# Patient Record
Sex: FEMALE | Race: BLACK OR AFRICAN AMERICAN | Employment: FULL TIME | URBAN - METROPOLITAN AREA
[De-identification: names, ages, dates, MRNs, and addresses within clinical notes are randomized per-mention and may not be internally consistent; named-entity substitution may affect disease eponyms.]

---

## 2021-01-14 ENCOUNTER — VIRTUAL VISIT (OUTPATIENT)
Dept: PULMONOLOGY | Age: 41
End: 2021-01-14
Payer: COMMERCIAL

## 2021-01-14 DIAGNOSIS — R06.83 SNORING: ICD-10-CM

## 2021-01-14 DIAGNOSIS — E03.9 ACQUIRED HYPOTHYROIDISM: ICD-10-CM

## 2021-01-14 DIAGNOSIS — E28.2 BILATERAL POLYCYSTIC OVARIAN SYNDROME: Chronic | ICD-10-CM

## 2021-01-14 DIAGNOSIS — G47.10 HYPERSOMNIA: Primary | ICD-10-CM

## 2021-01-14 PROCEDURE — 99204 OFFICE O/P NEW MOD 45 MIN: CPT | Performed by: INTERNAL MEDICINE

## 2021-01-14 RX ORDER — NORGESTREL AND ETHINYL ESTRADIOL 0.3-0.03MG
1 KIT ORAL DAILY
COMMUNITY

## 2021-01-14 ASSESSMENT — SLEEP AND FATIGUE QUESTIONNAIRES
ESS TOTAL SCORE: 10
HOW LIKELY ARE YOU TO NOD OFF OR FALL ASLEEP WHILE WATCHING TV: 2

## 2021-01-14 NOTE — PROGRESS NOTES
Prince Tim ERIC, Baudilio Coughlin, [facility]  Tiffanie Kehrt CNP Ileen Rudder CNP [place] [place] [address]  [place] Bermeo [address] [facility], [address] [facility] [phone]   [facility]   [place] Bermeo. [address] [place] [address]. Moriah Pradhan 37 [phone]     Video Visit- Consult    Pursuant to the emergency declaration under the [address] [place] [address] [facility] authority and the [employer] Act, this Virtual  Visit was conducted, with patient's consent, to reduce the patient's risk of exposure to COVID-19. Services were provided through a video synchronous discussion virtually to substitute for in-person clinic visit. Patient was located in their home. Assessment:      Visit Diagnoses and Associated Orders     Hypersomnia   (New Problem)  -  Primary    needs work-up    Home Sleep Study [[id number] Custom]   - Future Order         Snoring   (New Problem)      needs work-up    Home Sleep Study [[id number] Custom]   - Future Order         Acquired hypothyroidism   (Stable)           Bilateral polycystic ovarian syndrome   (Stable)           ORDERS WITHOUT AN ASSOCIATED DIAGNOSIS    norgestrel-ethinyl estradiol (ELINEST) 0.3-30 MG-MCG per tablet [[id number]]             Plan:      Differential diagnosis includes but not limited to: MIGDALIA, PLMD's, narcolepsy, parasomnias. Reviewed MIGDALIA (highest likelihood Dx): pathophysiology, diagnosis, complications and treatment. Instructed her not to drive if drowsy. Continue medications per her PCP and other physicians. Limit caffeine use after 3pm. Standard of care is to do in-lab PSG but insurance is mandating an inferior HST. 1 wk follow up after study to review her results. The chronic medical conditions listed are directly related to the primary diagnosis listed above. The management of the primary diagnosis affects the secondary diagnosis and vice versa. Continue meds for: hypothyroid and PCOS.     Orders Placed This Encounter Procedures    Home Sleep Study          Subjective:     Patient ID: Candy Cruz is a 36 y.o. female. Chief Complaint   Patient presents with    Insomnia     wakes during the night    Snoring       HPI:      Candy Cruz is a 36 y.o. female self-referred for a sleep evaluation. She complains of: snoring, excessive daytime sleepiness , non-restorative sleep, tossing and turning at night and night sweats. She denies: cataplexy and hypnagogic hallucinations. Has difficulty staying asleep. Ambien did not help but only took 5 mg and for 2 nights. Has tried several OTC meds for sleep which does not help. PCOS and hypothyroid : stable on meds and followed by pt's PCP and other physicians. Previous evaluation and treatment has included- none    DOT/CDL - No  FAA/'s license -No    Previous Report(s) Reviewed: historical medical records, office notes, andreferral letter(s). Pertinent data has been documented. [place] - Total score: 10    Caffeine Intake - None.     Social History     Socioeconomic History    Marital status:      Spouse name: Not on file    Number of children: Not on file    Years of education: Not on file    Highest education level: Not on file   Occupational History    Not on file   Social Needs    Financial resource strain: Not on file    Food insecurity     Worry: Not on file     Inability: Not on file    Transportation needs     Medical: Not on file     Non-medical: Not on file   Tobacco Use    Smoking status: Never Smoker    Smokeless tobacco: Never Used   Substance and Sexual Activity    Alcohol use: No    Drug use: No    Sexual activity: Yes     Partners: Male   Lifestyle    Physical activity     Days per week: Not on file     Minutes per session: Not on file    Stress: Not on file   Relationships    Social connections     Talks on phone: Not on file     Gets together: Not on file     Attends [employer] service: Not on file Active member of club or organization: Not on file     Attends meetings of clubs or organizations: Not on file     Relationship status: Not on file    Intimate partner violence     Fear of current or ex partner: Not on file     Emotionally abused: Not on file     Physically abused: Not on file     Forced sexual activity: Not on file   Other Topics Concern    Not on file   Social History Narrative    Not on file        Current Outpatient Medications   Medication Sig Dispense Refill    norgestrel-ethinyl estradiol (ELINEST) 0.3-30 MG-MCG per tablet Take 1 tablet by mouth daily      metFORMIN (GLUCOPHAGE) 850 MG tablet Take 500 mg by mouth 2 times daily (with meals)       levothyroxine (SYNTHROID) 75 MCG tablet Take 50 mcg by mouth daily       JOLIVETTE 0.35 MG tablet Take 1 tablet by mouth daily   0    Prenatal Vit-Fe Fumarate-FA (PRENATAL VITAMIN) 27-0.8 MG TABS Take 1 tablet by mouth daily        No current facility-administered medications for this visit. Allergies as of 2021    (No Known Allergies)       Patient Active Problem List   Diagnosis    Spontaneous breech delivery    Breech presentation with  problem   Nemo [place] breech presentation    H/O  section    Multigravida    Bilateral polycystic ovarian syndrome    Adnexal pain    Premature rupture of membranes with onset of labor more than 24 hours following rupture    Encounter for supervision of normal pregnancy in multigravida    Routine postpartum follow-up    Normal pregnancy, incidental    Uterine leiomyoma    Acquired hypothyroidism       Past Medical History:   Diagnosis Date    Acquired hypothyroidism 2021    Bilateral polycystic ovarian syndrome 2015       History reviewed. No pertinent surgical history. History reviewed. No pertinent family history.     Objective:     Vitals:  Patient reported Height and Weight Calculated BMI   Patient-Reported Vitals 2021 Patient-Reported Weight 127#   Patient-Reported Height 5'4\"       21.8     Due to COVID-19 this was a virtual visit and physical exam was deferred.     Electronically signed by Warden Tyler MD on1/14/2021 at 5:06 PM

## 2021-02-03 ENCOUNTER — HOSPITAL ENCOUNTER (OUTPATIENT)
Dept: SLEEP CENTER | Age: 41
Discharge: HOME | End: 2021-02-03
Payer: COMMERCIAL

## 2021-02-03 DIAGNOSIS — G47.10 HYPERSOMNIA: ICD-10-CM

## 2021-02-03 DIAGNOSIS — R06.83 SNORING: ICD-10-CM

## 2021-02-03 PROCEDURE — 95806 SLEEP STUDY UNATT&RESP EFFT: CPT | Performed by: INTERNAL MEDICINE

## 2021-02-03 PROCEDURE — 95806 SLEEP STUDY UNATT&RESP EFFT: CPT

## 2021-02-10 ENCOUNTER — TELEPHONE (OUTPATIENT)
Dept: PULMONOLOGY | Age: 41
End: 2021-02-10

## 2021-02-10 NOTE — PROGRESS NOTES
Jamarcus BRODERICK [place]         : 1980    Diagnosis: [x] MIGDALIA (G47.33) [] CSA (G47.31) [] Apnea (G47.30)   Length of Need: [x] 12 Months [] 99 Months [] Other:    Machine (TAJ!): [x] Respironics Dream Station      Auto [] ResMed AirSense     Auto [] Other:     [x]  CPAP () [] Bilevel ()   Mode: [x] Auto [] Spontaneous    Mode: [] Auto [] Spontaneous          P min 6 cmH2O  P max 16 cmH2O                 Comfort Settings:   - Ramp Pressure: 4   cmH2O                                        - Ramp time: 15 min                                     -  Flex/EPR - 3 full time                                    - For ResMed Bilevel (TiMax-4 sec   TiMin- 0.2 sec)     Humidifier: [x] Heated ()        [x] Water chamber replacement ()/ 1 per 6 months        Mask:   [x] Nasal () /1 per 3 months [] Full Face () /1 per 3 months   [x] Patient choice -Size and fit mask [] Patient Choice - Size and fit mask   [] Dispense:  [] Dispense:    [x] Headgear () / 1 per 3 months [] Headgear () / 1 per 3 months   [x] Replacement Nasal Cushion ()/2 per month [] Interface Replacement ()/1 per month   [] Replacement Nasal Pillows ()/2 per month         Tubing: [x] Heated ()/1 per 3 months    [] Standard ()/1 per 3 months [] Other:           Filters: [x] Non-disposable ()/1 per 6 months     [x] Ultra-Fine, Disposable ()/2 per month        Miscellaneous: [] Chin Strap ()/ 1 per 6 months [] O2 bleed-in:       LPM   [] Oximetry on CPAP/Bilevel []  Other:    [x] Modem: ()         Start Order Date: 02/10/21    MEDICAL JUSTIFICATION:  I, the undersigned, certify that the above prescribed supplies are medically necessary for this patients wellbeing. In my opinion, the supplies are both reasonable and necessary in reference to accepted standards of medicalpractice in treatment of this patients condition.     Verlin Najjar, MD      NPI: [id number]       Order

## 2021-02-24 ENCOUNTER — TELEPHONE (OUTPATIENT)
Dept: PULMONOLOGY | Age: 41
End: 2021-02-24

## 2021-02-24 NOTE — TELEPHONE ENCOUNTER
Patient [facility] she believes forgot to tell Dr Atul Randhawa she is taking 500mg of Magnesium. Patient wants to know if she should continue taking or stop taking it?  If patient doesn't answer, leave a detailed message [phone]

## 2021-04-01 ENCOUNTER — VIRTUAL VISIT (OUTPATIENT)
Dept: PULMONOLOGY | Age: 41
End: 2021-04-01
Payer: COMMERCIAL

## 2021-04-01 DIAGNOSIS — G47.33 OSA (OBSTRUCTIVE SLEEP APNEA): Primary | ICD-10-CM

## 2021-04-01 PROCEDURE — 99212 OFFICE O/P EST SF 10 MIN: CPT | Performed by: NURSE PRACTITIONER

## 2021-04-01 RX ORDER — UBIDECARENONE 75 MG
50 CAPSULE ORAL DAILY
COMMUNITY

## 2021-04-01 RX ORDER — MULTIVIT WITH MINERALS/LUTEIN
1000 TABLET ORAL DAILY
COMMUNITY

## 2021-04-01 RX ORDER — BIOTIN 1 MG
5000 TABLET ORAL
COMMUNITY

## 2021-04-01 RX ORDER — LEVOTHYROXINE SODIUM 0.05 MG/1
50 TABLET ORAL DAILY
COMMUNITY

## 2021-04-01 RX ORDER — FOLIC ACID 0.8 MG
TABLET ORAL NIGHTLY
COMMUNITY

## 2021-04-01 ASSESSMENT — SLEEP AND FATIGUE QUESTIONNAIRES
HOW LIKELY ARE YOU TO NOD OFF OR FALL ASLEEP WHILE SITTING INACTIVE IN A PUBLIC PLACE: 1
HOW LIKELY ARE YOU TO NOD OFF OR FALL ASLEEP WHILE WATCHING TV: 1
HOW LIKELY ARE YOU TO NOD OFF OR FALL ASLEEP WHILE SITTING QUIETLY AFTER LUNCH WITHOUT ALCOHOL: 1

## 2021-04-01 NOTE — PROGRESS NOTES
Vianey Barnett MD, FAASM, [facility]  Rogelio Cho, MSN, RN, CNP     [address] [facility] MEDICINE  [place] [address] [address] [place] [id number]  Dept: [phone]  Dept Fax: [phone]  Loc: [phone]    Subjective:     Patient ID: Merlinda Curt is a 36 y.o. female. Chief Complaint   Patient presents with    Sleep Apnea       HPI:      Sleep Medicine Video Visit    Pursuant to the emergency declaration under the [address] [place] [address], formerly [facility] waiver authority and the [employer] and [employer] Act this Telephone Visit was insisted, with patient's consent, to reduce the patient's risk of exposure to COVID-19 and provide continuity of care for an established patient. Services were provided through a synchronous discussion over a telephone and/or Video chat to substitute for in-person clinic visit, and coded as such. While patient is at home. Machine Modem/Download Info:  Compliance (hours/night): 8.1 hrs/night  Download AHI (/hour): 1.5 /HR  Average CPAP Pressure : 7.5 cmH2O           APAP - Settings  Pressure Min: 6 cmH2O  Pressure Max: 16 cmH2O                 Comfort Settings  Humidity Level (0-8): 1  Flex/EPR (0-3): 3 PAP Mask  Clinically Relevant Leak: No     [place] - Total score: 9    Follow-up :     Last Visit : January 2021    Had study Insurance mandated HST done on 2/4/2021 which showed an RHI - 5.5/hr with Low SaO2 - 92% and time below 90% of 0min.   This is consistent with mild MIGDALIA (327.23)      Subjective Health Changes: None      Over Night Oximetry: [] Yes  [] No  [x] NA [] WNL   Using O2: [] Yes  [] No  [x] NA   Patient is compliant with the machine  [x] Yes  [] No [] Per patient   Feeling rested when using the machine   [x] Yes  [] No     Pressure is comfortable with inspiration and expiration  [x] Yes  [] No   ([x] NA   [] Feeling of suffocation  [] Feeling like not enough air    [] To much pressure)     Noticed changes in pressure  [] NA  [x] Yes    [] No     Mask is fitting well  [x] Yes  [] No   Noting Mask Air Leak  [] Yes  [x] No   Having painful Aerophagia  [] Yes  [x] No   Nocturia   0  per night. Having  HA upon waking  [] Yes  [x] No   Dry mouth upon waking   Dry Nose  Dry Eyes  [x] Yes  [] No   Congestion upon waking   [x] Yes  [] No    Nose Bleeds  [] Yes  [x] No   Using Sleep Aides  Magnesium   [] NA  [x] OTC  [] Per our office   [] Per another provider   Understands how to change humidification and/or tubing temperature for comfort while at home  [x] Yes  [] No     Difficulties falling asleep  [] Yes  [x] No   Difficulties staying asleep  [] Yes  [x] No   Approximate time to bed  9pm   Approximate wake time  4-5am   Taking Naps  no   If taking naps usual length  [x] NA   If taking naps using the machine [x] NA  [] Yes    [] No    [] With and With out    Drowsy when driving  [] Yes  [x] No     Does patient carry a DOT/CDL  [] Yes  [x] No     Does patient carry FAA/P[employer] License   [] Yes  [x] No      Any concerns noted with the machine at this time  [] Yes  [x] No       Assessment/Plan:   1. MIGDALIA (obstructive sleep apnea)  Assessment & Plan:  New with treatment     After downloading data and reviewing  Reviewed compliance download with pt. Supplies and parts as needed for his machine. These are medically necessary. Continue medications per his PCP and other physicians. Limit caffeine use after 3pm.    The chronic medical conditions listed are directly related to the primary diagnosis listed above. The management of the primary diagnosis affects the secondary diagnosis and vice vers       The chronic medical conditions listed are directly related to the primary diagnosis listed above. The management of the primary diagnosis affects the secondary diagnosis and vice versa.      - After pulling data and reviewing it   - Reviewed compliance download with patient    -Medically necessary supplies and parts as needed for her machine.   - Continue medications per his primary care provider and other physicians.   - Encouraged to limit caffeine use after 3pm.    - Educated not to drive when feeling sleepy   - Patient using [employer]  - Tube Temperature  Humidifier  (if you are dry turn it high)  Rain Out   Office locations  Magnesium at night time     The chronic medical conditions listed are directly related to the primary diagnosis listed above. The management of the primary diagnosis affects the secondary diagnosis and vice versa.     - Will follow up in off in 6 months

## 2021-10-26 ENCOUNTER — VIRTUAL VISIT (OUTPATIENT)
Dept: PULMONOLOGY | Age: 41
End: 2021-10-26
Payer: COMMERCIAL

## 2021-10-26 DIAGNOSIS — G47.33 OSA (OBSTRUCTIVE SLEEP APNEA): Primary | ICD-10-CM

## 2021-10-26 PROCEDURE — 99212 OFFICE O/P EST SF 10 MIN: CPT | Performed by: NURSE PRACTITIONER

## 2021-10-26 ASSESSMENT — SLEEP AND FATIGUE QUESTIONNAIRES
HOW LIKELY ARE YOU TO NOD OFF OR FALL ASLEEP WHILE SITTING AND TALKING TO SOMEONE: 0
HOW LIKELY ARE YOU TO NOD OFF OR FALL ASLEEP WHILE SITTING INACTIVE IN A PUBLIC PLACE: 0
ESS TOTAL SCORE: 0
HOW LIKELY ARE YOU TO NOD OFF OR FALL ASLEEP IN A CAR, WHILE STOPPED FOR A FEW MINUTES IN TRAFFIC: 0
HOW LIKELY ARE YOU TO NOD OFF OR FALL ASLEEP WHEN YOU ARE A PASSENGER IN A CAR FOR AN HOUR WITHOUT A BREAK: 0
HOW LIKELY ARE YOU TO NOD OFF OR FALL ASLEEP WHILE SITTING QUIETLY AFTER LUNCH WITHOUT ALCOHOL: 0
HOW LIKELY ARE YOU TO NOD OFF OR FALL ASLEEP WHILE WATCHING TV: 0
HOW LIKELY ARE YOU TO NOD OFF OR FALL ASLEEP WHILE SITTING AND READING: 0
HOW LIKELY ARE YOU TO NOD OFF OR FALL ASLEEP WHILE LYING DOWN TO REST IN THE AFTERNOON WHEN CIRCUMSTANCES PERMIT: 0

## 2021-10-26 NOTE — PROGRESS NOTES
Jamarcus BRODERICK  [facility]         : 1980    Diagnosis: [x] MIGDALIA (G47.33) [] CSA (G47.31) [] Apnea (G47.30)   Length of Need: [x] 12 Months [] 99 Months [] Other:    Machine (TAJ!): [] Respironics Dream Station   2   Auto [] ResMed AirSense     Auto [] Other:     []  CPAP () [] Bilevel ()   Mode: [] Auto [] Spontaneous    Mode: [] Auto [] Spontaneous            Comfort Settings:   - Ramp Pressure:  cmH2O                                        - Ramp time: 15 min                                     -  Flex/EPR - 3 full time                                    - For ResMed Bilevel (TiMax-4 sec   TiMin- 0.2 sec)     Humidifier: [x] Heated ()        [x] Water chamber replacement ()/ 1 per 6 months        Mask:   [x] Nasal () /1 per 3 months [] Full Face () /1 per 3 months   [x] Patient choice -Size and fit mask [] Patient Choice - Size and fit mask   [] Dispense:  [] Dispense:    [x] Headgear () / 1 per 3 months [] Headgear () / 1 per 3 months   [x] Replacement Nasal Cushion ()/2 per month [] Interface Replacement ()/1 per month   [] Replacement Nasal Pillows ()/2 per month         Tubing: [x] Heated ()/1 per 3 months    [] Standard ()/1 per 3 months [] Other:           Filters: [x] Non-disposable ()/1 per 6 months     [x] Ultra-Fine, Disposable ()/2 per month        Miscellaneous: [] Chin Strap ()/ 1 per 6 months [] O2 bleed-in:       LPM   [] Oximetry on CPAP/Bilevel []  Other:    [x] Modem: ()         Start Order Date: 10/26/21    MEDICAL JUSTIFICATION:  I, the undersigned, certify that the above prescribed supplies are medically necessary for this patients wellbeing. In my opinion, the supplies are both reasonable and necessary in reference to accepted standards of medicalpractice in treatment of this patients condition.     CARLOS Reyes CNP      NPI: [id number]       Order Signed Date: 10/26/21    Electronically signed by CARLOS Villalpando CNP on 10/26/2021 at 4:49 PM    [facility]  1980  [address] [place] [address] [phone] (home)   [phone] (mobile)      Insurance Info (confirm with patient if correct):  Payor/Plan Subscr  Sex Relation Sub.  Ins. ID Effective Group Num

## 2022-10-25 ENCOUNTER — TELEMEDICINE (OUTPATIENT)
Dept: PULMONOLOGY | Age: 42
End: 2022-10-25
Payer: COMMERCIAL

## 2022-10-25 DIAGNOSIS — E03.9 ACQUIRED HYPOTHYROIDISM: Chronic | ICD-10-CM

## 2022-10-25 DIAGNOSIS — G47.33 OSA (OBSTRUCTIVE SLEEP APNEA): Chronic | ICD-10-CM

## 2022-10-25 PROCEDURE — 99214 OFFICE O/P EST MOD 30 MIN: CPT | Performed by: INTERNAL MEDICINE

## 2022-10-25 ASSESSMENT — SLEEP AND FATIGUE QUESTIONNAIRES
HOW LIKELY ARE YOU TO NOD OFF OR FALL ASLEEP WHILE SITTING AND READING: 2
HOW LIKELY ARE YOU TO NOD OFF OR FALL ASLEEP WHILE SITTING INACTIVE IN A PUBLIC PLACE: 0
HOW LIKELY ARE YOU TO NOD OFF OR FALL ASLEEP WHILE LYING DOWN TO REST IN THE AFTERNOON WHEN CIRCUMSTANCES PERMIT: 3
HOW LIKELY ARE YOU TO NOD OFF OR FALL ASLEEP WHILE WATCHING TV: 1
HOW LIKELY ARE YOU TO NOD OFF OR FALL ASLEEP WHILE SITTING QUIETLY AFTER LUNCH WITHOUT ALCOHOL: 0
HOW LIKELY ARE YOU TO NOD OFF OR FALL ASLEEP WHEN YOU ARE A PASSENGER IN A CAR FOR AN HOUR WITHOUT A BREAK: 0
HOW LIKELY ARE YOU TO NOD OFF OR FALL ASLEEP WHILE SITTING AND TALKING TO SOMEONE: 1
HOW LIKELY ARE YOU TO NOD OFF OR FALL ASLEEP IN A CAR, WHILE STOPPED FOR A FEW MINUTES IN TRAFFIC: 0
ESS TOTAL SCORE: 7

## 2022-10-25 NOTE — PROGRESS NOTES
Adam Godwin [facility]  [address] [facility], [address]- [phone]   [facility] [address] [place] Bermeo. [address] [place] [address]. Moriah Pradhan 37 [phone]     Video Visit- Follow up    [facility], was evaluated through a synchronous (real-time) audio-video  encounter. The patient (or guardian if applicable) is aware that this is a billable  service, which includes applicable co-pays. This Virtual Visit was conducted with  patient's (and/or legal guardian's) consent. The visit was conducted pursuant to  the emergency declaration under the [facility], [address] [place] [address] waiver authority and the [employer] and  [employer] Act. Patient identification was verified,  and a caregiver was present when appropriate. The patient was located in a  state where the provider was licensed to provide care. Assessment/Plan:      1. MIGDALIA (obstructive sleep apnea)  Assessment & Plan:  Chronic-not Stable: Reviewed and analyzed results of physiologic download from patient's machine and reviewed with patient. Supplies and parts as needed for her machine. These are medically necessary. Limit caffeine use after 3pm. Based on the analyzed data will change following settings: Pmin-8. If patient's not doing better she may need an in lab titration and/or change to bilevel. 2. Acquired hypothyroidism  Assessment & Plan:  Chronic- Stable. Discussed the importance of treating sleep apnea as part of the management of this disorder. Cont any meds per PCP and other physicians. Reviewed, analyzed, and documented physiologic data from patient's PAP machine. This information was analyzed to assess complexity and medical decision making in regards to further testing and management. Diagnoses of MIGDALIA (obstructive sleep apnea) and Acquired hypothyroidism were pertinent to this visit.  The chronic medical conditions listed are directly related to the primary diagnosis listed above. The management of the primary diagnosis affects the secondary diagnosis and vice versa. Subjective:     Patient ID: Ethel Torres is a 43 y.o. female. Chief Complaint   Patient presents with    Sleep Apnea     Subjective   HPI:    Machine Modem/Download Info:  Compliance (hours/night): 6.75 hrs/night  % of nights >= 4 hrs: 98.9 %  Download AHI (/hour): 1 /HR  Average CPAP Pressure : 6.5 cmH2O   APAP - Settings  Pressure Min: 6 cmH2O  Pressure Max: 16 cmH2O                 Comfort Settings  Humidity Level (0-8): 0  Flex/EPR (0-3): 3       Got her replaced machine but does not feel it is doing as well as her old machine. She sleeps religiously with her machine but no matter what she wakes up at 3 AM and still gets tired in the afternoon. The pressure feels okay but there are times she wakes up with aerophagia.     [facility]    Welch - Welch Sleepiness Score: 7    Current Outpatient Medications   Medication Instructions    Biotin 5,000 mcg, Oral    levothyroxine (SYNTHROID) 50 mcg, Oral, DAILY    Magnesium 500 MG CAPS Oral, NIGHTLY    Multiple Vitamins-Minerals (MULTIVITAMIN ADULTS PO) Oral    norgestrel-ethinyl estradiol (ELINEST) 0.3-30 MG-MCG per tablet 1 tablet, Oral, DAILY    vitamin B-12 (CYANOCOBALAMIN) 50 mcg, Oral, DAILY    vitamin C 1,000 mg, Oral, DAILY        Electronically signed by Dulce Ro MD on 10/25/2022 at 4:54 PM

## 2022-10-25 NOTE — LETTER
[facility] Sleep Medicine  [address] [place]  Phone: [phone]  Fax: [phone]    Nadine Magana MD    October 25, 2022     Esther Lombardi  [address] [place] [address]    Patient: Altaf Villatoro   MR Number: [id number]   YOB: 1980   Date of Visit: 10/25/2022       Dear Esther Harjit: Thank you for referring Altaf Villatoro to me for evaluation/treatment. Below are the relevant portions of my assessment and plan of care. 1. MIGDALIA (obstructive sleep apnea)  Assessment & Plan:  Chronic-not Stable: Reviewed and analyzed results of physiologic download from patient's machine and reviewed with patient. Supplies and parts as needed for her machine. These are medically necessary. Limit caffeine use after 3pm. Based on the analyzed data will change following settings: Pmin-8. If patient's not doing better she may need an in lab titration and/or change to bilevel. 2. Acquired hypothyroidism  Assessment & Plan:  Chronic- Stable. Discussed the importance of treating sleep apnea as part of the management of this disorder. Cont any meds per PCP and other physicians. Reviewed, analyzed, and documented physiologic data from patient's PAP machine. This information was analyzed to assess complexity and medical decision making in regards to further testing and management. Diagnoses of MIGDALIA (obstructive sleep apnea) and Acquired hypothyroidism were pertinent to this visit. The chronic medical conditions listed are directly related to the primary diagnosis listed above. The management of the primary diagnosis affects the secondary diagnosis and vice versa. If you have questions, please do not hesitate to call me. I look forward to following Jamarcus along with you.     Sincerely,      Nadine Magana MD

## 2022-10-25 NOTE — ASSESSMENT & PLAN NOTE
Chronic-not Stable: Reviewed and analyzed results of physiologic download from patient's machine and reviewed with patient. Supplies and parts as needed for her machine. These are medically necessary. Limit caffeine use after 3pm. Based on the analyzed data will change following settings: Pmin-8. If patient's not doing better she may need an in lab titration and/or change to bilevel.

## 2022-10-25 NOTE — PROGRESS NOTES
Jamarcus BRODERICK [address] [place] [address]         : 1980    Diagnosis: [x] MIGDALIA (G47.33) [] CSA (G47.31) [] Apnea (G47.30)   Length of Need: [x] 18 Months [] 99 Months [] Other:    Machine (TAJ!): [] Respironics Dream Station      Auto [] ResMed AirSense     Auto [] Other:     []  CPAP () [] Bilevel ()   Mode: [] Auto [] Spontaneous    Mode: [] Auto [] Spontaneous            Comfort Settings:        Humidifier: [] Heated ()        [x] Water chamber replacement ()/ 1 per 6 months        Mask:   [x] Nasal () /1 per 3 months [] Full Face () /1 per 3 months   [x] Patient choice -Size and fit mask [] Patient Choice - Size and fit mask   [] Dispense:  [] Dispense:    [x] Headgear () / 1 per 3 months [] Headgear () / 1 per 3 months   [x] Replacement Nasal Cushion ()/2 per month [] Interface Replacement ()/1 per month   [x] Replacement Nasal Pillows ()/2 per month         Tubing: [x] Heated ()/1 per 3 months    [] Standard ()/1 per 3 months [] Other:           Filters: [x] Non-disposable ()/1 per 6 months     [x] Ultra-Fine, Disposable ()/2 per month        Miscellaneous: [] Chin Strap ()/ 1 per 6 months [] O2 bleed-in:       LPM   [] Oximetry on CPAP/Bilevel []  Other:    [x] Modem: ()         Start Order Date: 10/25/22    MEDICAL JUSTIFICATION:  I, the undersigned, certify that the above prescribed supplies are medically necessary for this patients wellbeing. In my opinion, the supplies are both reasonable and necessary in reference to accepted standards of medicalpractice in treatment of this patients condition.     Alexandria Kim MD      NPI: [id number]        Order Signed Date: 10/25/22    Electronically signed by Alexandria Kim MD on 10/25/2022 at 4:00 PM    [address]  1980  [address] [place] [address] [phone] (home)   [phone] (mobile)      Insurance Info (confirm with patient if correct):  Payer/Plan Subscr  Sex Relation Sub.  Ins. ID Effective Group Num

## 2023-01-18 ENCOUNTER — OFFICE VISIT (OUTPATIENT)
Dept: PULMONOLOGY | Age: 43
End: 2023-01-18
Payer: COMMERCIAL

## 2023-01-18 VITALS
BODY MASS INDEX: 23.08 KG/M2 | HEIGHT: 64 IN | WEIGHT: 135.2 LBS | OXYGEN SATURATION: 100 % | DIASTOLIC BLOOD PRESSURE: 66 MMHG | SYSTOLIC BLOOD PRESSURE: 102 MMHG | HEART RATE: 72 BPM

## 2023-01-18 DIAGNOSIS — G47.33 OSA (OBSTRUCTIVE SLEEP APNEA): Primary | Chronic | ICD-10-CM

## 2023-01-18 PROCEDURE — 99213 OFFICE O/P EST LOW 20 MIN: CPT | Performed by: INTERNAL MEDICINE

## 2023-01-18 ASSESSMENT — SLEEP AND FATIGUE QUESTIONNAIRES
HOW LIKELY ARE YOU TO NOD OFF OR FALL ASLEEP WHILE SITTING AND READING: 3
HOW LIKELY ARE YOU TO NOD OFF OR FALL ASLEEP WHILE LYING DOWN TO REST IN THE AFTERNOON WHEN CIRCUMSTANCES PERMIT: 3
HOW LIKELY ARE YOU TO NOD OFF OR FALL ASLEEP WHILE WATCHING TV: 2
HOW LIKELY ARE YOU TO NOD OFF OR FALL ASLEEP WHEN YOU ARE A PASSENGER IN A CAR FOR AN HOUR WITHOUT A BREAK: 0
HOW LIKELY ARE YOU TO NOD OFF OR FALL ASLEEP WHILE SITTING QUIETLY AFTER LUNCH WITHOUT ALCOHOL: 0
HOW LIKELY ARE YOU TO NOD OFF OR FALL ASLEEP WHILE SITTING INACTIVE IN A PUBLIC PLACE: 1
HOW LIKELY ARE YOU TO NOD OFF OR FALL ASLEEP IN A CAR, WHILE STOPPED FOR A FEW MINUTES IN TRAFFIC: 0
ESS TOTAL SCORE: 9
HOW LIKELY ARE YOU TO NOD OFF OR FALL ASLEEP WHILE SITTING AND TALKING TO SOMEONE: 0

## 2023-01-18 NOTE — PROGRESS NOTES
Estelle Rain CNP [address]  [address] [facility]  [address], [address] [facility] [phone]   [facility] [address] Kalee San. [facility]. Moriah Pradhan [address] [phone]     [facility] [id number] [phone]      Assessment/Plan:      1. MIGDALIA (obstructive sleep apnea)  Assessment & Plan:  Chronic-not Stable: Reviewed and analyzed results of physiologic download from patient's machine and reviewed with patient. Supplies and parts as needed for her machine. These are medically necessary. Limit caffeine use after 3pm. Based on the analyzed data will order an in lab titration. The patient is failed over 30 days of AutoPap trial.  Despite a well fitting mask, compliant use of her machine, and AHI below 5 the patient still not waking rested and cannot maintain sleep through the night. She needs an in lab titration to assess for sleep apnea is fully controlled and possible change to bilevel given some mild aerophagia that she is now having. Orders:  -     Sleep Study with PAP Titration; Future    Reviewed, analyzed, and documented physiologic data from patient's PAP machine. This information was analyzed to assess complexity and medical decision making in regards to further testing and management. Subjective:   Subjective   Patient ID: Elyse Aguayo is a 43 y.o. female. Chief Complaint   Patient presents with    Sleep Apnea       HPI:  Machine Modem/Download Info:  Compliance (hours/night): 6.4 hrs/night  % of nights >= 4 hrs: 98.8 %  Download AHI (/hour): 0.9 /HR  Average CPAP Pressure : 9 cmH2O     APAP - Settings  Pressure Min: 8 cmH2O  Pressure Max: 16 cmH2O                 Comfort Settings  Humidity Level (0-8): 1  Flex/EPR (0-3): 3       Despite increasing her pressure she is not able to maintain sleep.   She uses her machine every night but struggles to maintain sleep and still not feeling rested when she wakes up. She has had more issues with aerophagia as we have increased the pressures. [place] Sleepiness Score: 9    Social History     Socioeconomic History    Marital status:      Spouse name: Not on file    Number of children: Not on file    Years of education: Not on file    Highest education level: Not on file   Occupational History    Not on file   Tobacco Use    Smoking status: Never    Smokeless tobacco: Never   Substance and Sexual Activity    Alcohol use: No    Drug use: No    Sexual activity: Yes     Partners: Male   Other Topics Concern    Not on file   Social History Narrative    Not on file     Social Determinants of Health     Financial Resource Strain: Not on file   Food Insecurity: Not on file   Transportation Needs: Not on file   Physical Activity: Not on file   Stress: Not on file   Social Connections: Not on file   Intimate Partner Violence: Not on file   Housing Stability: Not on file       Current Outpatient Medications   Medication Instructions    Biotin 5,000 mcg, Oral    levothyroxine (SYNTHROID) 50 mcg, Oral, DAILY    Magnesium 500 MG CAPS Oral, NIGHTLY    Multiple Vitamins-Minerals (MULTIVITAMIN ADULTS PO) Oral    norgestrel-ethinyl estradiol (ELINEST) 0.3-30 MG-MCG per tablet 1 tablet, Oral, DAILY    vitamin B-12 (CYANOCOBALAMIN) 50 mcg, Oral, DAILY    vitamin C 1,000 mg, Oral, DAILY          Vitals:  Weight BMI   Wt Readings from Last 3 Encounters:   01/18/23 135 lb 3.2 oz (61.3 kg)   06/07/16 142 lb (64.4 kg)    Body mass index is 23.21 kg/m².      BP HR SaO2   BP Readings from Last 3 Encounters:   01/18/23 102/66   06/07/16 115/70    Pulse Readings from Last 3 Encounters:   01/18/23 72   06/07/16 68    SpO2 Readings from Last 3 Encounters:   01/18/23 100%   06/07/16 98%        Electronically signed by Kelly Ballard MD on 1/18/2023 at 3:03 PM

## 2023-01-18 NOTE — LETTER
[facility]  [place] [address] [place] [address]  Phone: [phone]  Fax: [phone]    Nishant Mcgee MD    January 18, 2023     Winsome Bear  [address] [place] [address]    Patient: Jessy Christopher   MR Number: [id number]   YOB: 1980   Date of Visit: 1/18/2023       Dear Winsome Bear: Thank you for referring Jessy Christopher to me for evaluation/treatment. Below are the relevant portions of my assessment and plan of care. 1. MIGDALIA (obstructive sleep apnea)  Assessment & Plan:  Chronic-not Stable: Reviewed and analyzed results of physiologic download from patient's machine and reviewed with patient. Supplies and parts as needed for her machine. These are medically necessary. Limit caffeine use after 3pm. Based on the analyzed data will order an in lab titration. The patient is failed over 30 days of AutoPap trial.  Despite a well fitting mask, compliant use of her machine, and AHI below 5 the patient still not waking rested and cannot maintain sleep through the night. She needs an in lab titration to assess for sleep apnea is fully controlled and possible change to bilevel given some mild aerophagia that she is now having. Orders:  -     Sleep Study with PAP Titration; Future    Reviewed, analyzed, and documented physiologic data from patient's PAP machine. This information was analyzed to assess complexity and medical decision making in regards to further testing and management. If you have questions, please do not hesitate to call me. I look forward to following Jamarcus along with you.     Sincerely,      Nishant Mcgee MD

## 2023-01-18 NOTE — ASSESSMENT & PLAN NOTE
Chronic-not Stable: Reviewed and analyzed results of physiologic download from patient's machine and reviewed with patient. Supplies and parts as needed for her machine. These are medically necessary. Limit caffeine use after 3pm. Based on the analyzed data will order an in lab titration. The patient is failed over 30 days of AutoPap trial.  Despite a well fitting mask, compliant use of her machine, and AHI below 5 the patient still not waking rested and cannot maintain sleep through the night. She needs an in lab titration to assess for sleep apnea is fully controlled and possible change to bilevel given some mild aerophagia that she is now having.

## 2023-02-10 ENCOUNTER — HOSPITAL ENCOUNTER (OUTPATIENT)
Dept: SLEEP CENTER | Age: 43
Discharge: HOME | End: 2023-02-10

## 2023-02-10 DIAGNOSIS — G47.33 OSA (OBSTRUCTIVE SLEEP APNEA): Chronic | ICD-10-CM

## 2023-02-21 ENCOUNTER — TELEPHONE (OUTPATIENT)
Dept: PULMONOLOGY | Age: 43
End: 2023-02-21

## 2023-02-21 NOTE — TELEPHONE ENCOUNTER
LM letting pt know per study results pressure was changed via modem .  Pt was asked to call with any questions and to schedule a f/u